# Patient Record
Sex: MALE | Race: WHITE | Employment: FULL TIME | URBAN - METROPOLITAN AREA
[De-identification: names, ages, dates, MRNs, and addresses within clinical notes are randomized per-mention and may not be internally consistent; named-entity substitution may affect disease eponyms.]

---

## 2017-01-10 ENCOUNTER — OFFICE VISIT (OUTPATIENT)
Dept: FAMILY MEDICINE | Facility: CLINIC | Age: 37
End: 2017-01-10
Payer: COMMERCIAL

## 2017-01-10 VITALS
TEMPERATURE: 98.1 F | BODY MASS INDEX: 25.57 KG/M2 | WEIGHT: 221 LBS | HEIGHT: 78 IN | SYSTOLIC BLOOD PRESSURE: 139 MMHG | DIASTOLIC BLOOD PRESSURE: 77 MMHG | RESPIRATION RATE: 16 BRPM | HEART RATE: 79 BPM

## 2017-01-10 DIAGNOSIS — F41.9 ANXIETY: ICD-10-CM

## 2017-01-10 DIAGNOSIS — F33.0 MAJOR DEPRESSIVE DISORDER, RECURRENT EPISODE, MILD (H): Primary | ICD-10-CM

## 2017-01-10 PROCEDURE — 99213 OFFICE O/P EST LOW 20 MIN: CPT | Performed by: PHYSICIAN ASSISTANT

## 2017-01-10 RX ORDER — HYDROXYZINE HYDROCHLORIDE 25 MG/1
25-50 TABLET, FILM COATED ORAL EVERY 6 HOURS PRN
Qty: 60 TABLET | Refills: 1 | Status: SHIPPED | OUTPATIENT
Start: 2017-01-10 | End: 2017-04-02

## 2017-01-10 RX ORDER — FLUOXETINE 10 MG/1
CAPSULE ORAL
Qty: 60 CAPSULE | Refills: 1 | Status: SHIPPED | OUTPATIENT
Start: 2017-01-10 | End: 2017-02-13

## 2017-01-10 RX ORDER — BUPROPION HYDROCHLORIDE 150 MG/1
150 TABLET ORAL EVERY MORNING
Qty: 7 TABLET | Refills: 0 | Status: SHIPPED | OUTPATIENT
Start: 2017-01-10 | End: 2017-02-13

## 2017-01-10 ASSESSMENT — ANXIETY QUESTIONNAIRES
2. NOT BEING ABLE TO STOP OR CONTROL WORRYING: MORE THAN HALF THE DAYS
7. FEELING AFRAID AS IF SOMETHING AWFUL MIGHT HAPPEN: NOT AT ALL
3. WORRYING TOO MUCH ABOUT DIFFERENT THINGS: NEARLY EVERY DAY
5. BEING SO RESTLESS THAT IT IS HARD TO SIT STILL: MORE THAN HALF THE DAYS
1. FEELING NERVOUS, ANXIOUS, OR ON EDGE: NEARLY EVERY DAY
GAD7 TOTAL SCORE: 14
6. BECOMING EASILY ANNOYED OR IRRITABLE: SEVERAL DAYS

## 2017-01-10 ASSESSMENT — PATIENT HEALTH QUESTIONNAIRE - PHQ9: 5. POOR APPETITE OR OVEREATING: NEARLY EVERY DAY

## 2017-01-10 NOTE — PATIENT INSTRUCTIONS
Patient Education    Fluoxetine Hydrochloride Oral capsule [Depression/Mood Disorders]    Fluoxetine Hydrochloride Oral capsule [Premenstrual Syndrome]    Fluoxetine Hydrochloride Oral capsule, gastro-resistant pellets, weekly [Depression/Mood Disorders]    Fluoxetine Hydrochloride Oral solution [Depression/Mood Disorders]    Fluoxetine Hydrochloride Oral tablet [Depression/Mood Disorders]    Fluoxetine Hydrochloride Oral tablet [Premenstrual Syndrome]  Fluoxetine Hydrochloride Oral capsule [Depression/Mood Disorders]  What is this medicine?  FLUOXETINE (floo OX e teen) belongs to a class of drugs known as selective serotonin reuptake inhibitors (SSRIs). It helps to treat mood problems such as depression, obsessive compulsive disorder, and panic attacks. It can also treat certain eating disorders.  This medicine may be used for other purposes; ask your health care provider or pharmacist if you have questions.  What should I tell my health care provider before I take this medicine?  They need to know if you have any of these conditions:    bipolar disorder or andrew    diabetes    glaucoma    liver disease    psychosis    seizures    suicidal thoughts or history of attempted suicide    an unusual or allergic reaction to fluoxetine, other medicines, foods, dyes, or preservatives    pregnant or trying to get pregnant    breast-feeding  How should I use this medicine?  Take this medicine by mouth with a glass of water. Follow the directions on the prescription label. You can take this medicine with or without food. Take your medicine at regular intervals. Do not take it more often than directed. Do not stop taking this medicine suddenly except upon the advice of your doctor. Stopping this medicine too quickly may cause serious side effects or your condition may worsen.  A special MedGuide will be given to you by the pharmacist with each prescription and refill. Be sure to read this information carefully each  time.  Talk to your pediatrician regarding the use of this medicine in children. While this drug may be prescribed for children as young as 7 years for selected conditions, precautions do apply.  Overdosage: If you think you have taken too much of this medicine contact a poison control center or emergency room at once.  NOTE: This medicine is only for you. Do not share this medicine with others.  What if I miss a dose?  If you miss a dose, skip the missed dose and go back to your regular dosing schedule. Do not take double or extra doses.  What may interact with this medicine?  Do not take fluoxetine with any of the following medications:    other medicines containing fluoxetine, like Sarafem or Symbyax    cisapride    linezolid    MAOIs like Carbex, Eldepryl, Marplan, Nardil, and Parnate    methylene blue (injected into a vein)    pimozide    thioridazine  This medicine may also interact with the following medications:    alcohol    aspirin and aspirin-like medicines    carbamazepine    certain medicines for depression, anxiety, or psychotic disturbances    certainmedicines for migraine headaches like almotriptan, eletriptan, frovatriptan, naratriptan, rizatriptan, sumatriptan, zolmitriptan    digoxin    diuretics    fentanyl    flecainide    furazolidone    isoniazid    lithium    medicines for sleep    medicines that treat or prevent blood clots like warfarin, enoxaparin, and dalteparin    NSAIDs, medicines for pain and inflammation, like ibuprofen or naproxen    phenytoin    procarbazine    propafenone    rasagiline    ritonavir    supplements like Moncho's wort, kava kava, valerian    tramadol    tryptophan    vinblastine  This list may not describe all possible interactions. Give your health care provider a list of all the medicines, herbs, non-prescription drugs, or dietary supplements you use. Also tell them if you smoke, drink alcohol, or use illegal drugs. Some items may interact with your  medicine.  What should I watch for while using this medicine?  Tell your doctor if your symptoms do not get better or if they get worse. Visit your doctor or health care professional for regular checks on your progress. Because it may take several weeks to see the full effects of this medicine, it is important to continue your treatment as prescribed by your doctor.  Patients and their families should watch out for new or worsening thoughts of suicide or depression. Also watch out for sudden changes in feelings such as feeling anxious, agitated, panicky, irritable, hostile, aggressive, impulsive, severely restless, overly excited and hyperactive, or not being able to sleep. If this happens, especially at the beginning of treatment or after a change in dose, call your health care professional.  You may get drowsy or dizzy. Do not drive, use machinery, or do anything that needs mental alertness until you know how this medicine affects you. Do not stand or sit up quickly, especially if you are an older patient. This reduces the risk of dizzy or fainting spells. Alcohol may interfere with the effect of this medicine. Avoid alcoholic drinks.  Your mouth may get dry. Chewing sugarless gum or sucking hard candy, and drinking plenty of water may help. Contact your doctor if the problem does not go away or is severe.  This medicine may affect blood sugar levels. If you have diabetes, check with your doctor or health care professional before you change your diet or the dose of your diabetic medicine.  What side effects may I notice from receiving this medicine?  Side effects that you should report to your doctor or health care professional as soon as possible:    allergic reactions like skin rash, itching or hives, swelling of the face, lips, or tongue    breathing problems    confusion    eye pain, changes in vision    fast or irregular heart rate, palpitations    flu-like fever, chills, cough, muscle or joint aches and  pains    seizures    suicidal thoughts or other mood changes    swelling or redness in or around the eye    tremors    trouble sleeping    unusual bleeding or bruising    unusually tired or weak    vomiting  Side effects that usually do not require medical attention (report to your doctor or health care professional if they continue or are bothersome):    change in sex drive or performance    diarrhea    dry mouth    flushing    headache    increased or decreased appetite    nausea    sweating  This list may not describe all possible side effects. Call your doctor for medical advice about side effects. You may report side effects to FDA at 9-748-FDA-5010.  Where should I keep my medicine?  Keep out of the reach of children.  Store at room temperature between 15 and 30 degrees C (59 and 86 degrees F). Throw away any unused medicine after the expiration date.  NOTE:This sheet is a summary. It may not cover all possible information. If you have questions about this medicine, talk to your doctor, pharmacist, or health care provider. Copyright  2016 Gold Standard

## 2017-01-10 NOTE — NURSING NOTE
"Chief Complaint   Patient presents with     Anxiety     Initial /77 mmHg  Pulse 79  Temp(Src) 98.1  F (36.7  C) (Oral)  Resp 16  Ht 6' 7\" (2.007 m)  Wt 221 lb (100.245 kg)  BMI 24.89 kg/m2 Estimated body mass index is 24.89 kg/(m^2) as calculated from the following:    Height as of this encounter: 6' 7\" (2.007 m).    Weight as of this encounter: 221 lb (100.245 kg)..  BP completed using cuff size regular-RA      "

## 2017-01-10 NOTE — PROGRESS NOTES
"  SUBJECTIVE:                                                    Wicho Vaughn is a 36 year old male who presents to clinic today for the following health issues:    Depression and Anxiety Follow-Up    Status since last visit: depression-not noticed any changes since starting Wellbutrin    Other associated symptoms: anxiety worsened the past week, decreased appetite, troubles sleeping, low motivation     Current therapy: currently taking Wellbutrin 300 mg daily. Had initial meeting with therapist. He describes himself as a private person and discussing personal issues was challenging.      Significant life event: going through divorce     Current substance abuse: currently using marijuana daily x last several months (smoking, edibles), not using alcohol or drugs otherwise    Other hx: the patient has been using his wife's Ativan for last two weeks for anxiety reduction; used ~7-10 doses. Currently requesting personal Rx for Ativan. Describes friends as support network.    PHQ-9 SCORE 7/3/2013 12/16/2016   Total Score 8 -   Total Score - 19     CORBY-7 SCORE 12/16/2016   Total Score 16        PHQ-9  English      PHQ-9   Any Language     GAD7       Problem list and histories reviewed & adjusted, as indicated.  Additional history: as documented    Problem list, Medication list, Allergies, and Medical/Social/Surgical histories reviewed in [facility] and updated as appropriate.    ROS:  Constitutional, HEENT, cardiovascular, pulmonary, psych, neuro systems are negative, except as otherwise noted.    OBJECTIVE:                                                    /77 mmHg  Pulse 79  Temp(Src) 98.1  F (36.7  C) (Oral)  Resp 16  Ht 6' 7\" (2.007 m)  Wt 221 lb (100.245 kg)  BMI 24.89 kg/m2  Body mass index is 24.89 kg/(m^2).  GENERAL: healthy, alert and no distress  RESP: lungs clear to auscultation - no rales, rhonchi or wheezes  CV: regular rates and rhythm, normal S1 S2, no S3 or S4 and no murmur, click or " rub  PSYCH: mentation appears normal, affect normal/bright    Provider exam: NAD. Mentation normal. Normal affect. No respiratory distress.       Diagnostic Test Results:  none      ASSESSMENT/PLAN:                                                      (F33.0) Major depressive disorder, recurrent episode, mild (H)  (primary encounter diagnosis)  Comment: has not improved with wellbutrin. Discussed other management options. Has never been on SSRI. Will attempt prozac. Recommending follow up in 1 month for med check. Will taper off of wellbutrin and taper up on prozac.   Plan: buPROPion (WELLBUTRIN XL) 150 MG 24 hr tablet,         FLUoxetine (PROZAC) 10 MG capsule        -Medication use and side effects discussed with the patient. Patient is in complete understanding and agreement with plan.       (F41.9) Anxiety  Comment: as above. Discussed how wellbutrin is not routinely helpful in anxiety. Also discussed how marijuana use is likely making anxiety worse. Recommending discontinuing this. Also informed given marijuana use, will not be able to prescribed ativan and advise not using this in conjunction with marijuana. Will however give atarax to patient with hope this aids with worsening anxiety/panic attacks while prozac is allowed to work. Educated this may take 2-4 weeks.   Plan: FLUoxetine (PROZAC) 10 MG capsule, hydrOXYzine         (ATARAX) 25 MG tablet        -Medication use and side effects discussed with the patient. Patient is in complete understanding and agreement with plan.         Follow up: as above.     Of note, Rocío VELASQUEZS saw patient conducting above history and personal exam. I conducted my own exam, assessment, and plan as outlined above. Bolded changes were made to aid in teaching of student.         Alex Holguin PA-C  [facility]

## 2017-01-10 NOTE — MR AVS SNAPSHOT
After Visit Summary   1/10/2017    Wicho Vaughn    MRN: [id number]           Patient Information     Date Of Birth          1980        Visit Information        Provider Department      1/10/2017 7:15 AM Alex Holguin PA-C [facility]        Today's Diagnoses     Major depressive disorder, recurrent episode, mild (H)    -  1     Anxiety           Care Instructions      Patient Education    Fluoxetine Hydrochloride Oral capsule [Depression/Mood Disorders]    Fluoxetine Hydrochloride Oral capsule [Premenstrual Syndrome]    Fluoxetine Hydrochloride Oral capsule, gastro-resistant pellets, weekly [Depression/Mood Disorders]    Fluoxetine Hydrochloride Oral solution [Depression/Mood Disorders]    Fluoxetine Hydrochloride Oral tablet [Depression/Mood Disorders]    Fluoxetine Hydrochloride Oral tablet [Premenstrual Syndrome]  Fluoxetine Hydrochloride Oral capsule [Depression/Mood Disorders]  What is this medicine?  FLUOXETINE (floo OX e teen) belongs to a class of drugs known as selective serotonin reuptake inhibitors (SSRIs). It helps to treat mood problems such as depression, obsessive compulsive disorder, and panic attacks. It can also treat certain eating disorders.  This medicine may be used for other purposes; ask your health care provider or pharmacist if you have questions.  What should I tell my health care provider before I take this medicine?  They need to know if you have any of these conditions:    bipolar disorder or andrew    diabetes    glaucoma    liver disease    psychosis    seizures    suicidal thoughts or history of attempted suicide    an unusual or allergic reaction to fluoxetine, other medicines, foods, dyes, or preservatives    pregnant or trying to get pregnant    breast-feeding  How should I use this medicine?  Take this medicine by mouth with a glass of water. Follow the directions on the prescription label. You can take this medicine with or  without food. Take your medicine at regular intervals. Do not take it more often than directed. Do not stop taking this medicine suddenly except upon the advice of your doctor. Stopping this medicine too quickly may cause serious side effects or your condition may worsen.  A special MedGuide will be given to you by the pharmacist with each prescription and refill. Be sure to read this information carefully each time.  Talk to your pediatrician regarding the use of this medicine in children. While this drug may be prescribed for children as young as 7 years for selected conditions, precautions do apply.  Overdosage: If you think you have taken too much of this medicine contact a poison control center or emergency room at once.  NOTE: This medicine is only for you. Do not share this medicine with others.  What if I miss a dose?  If you miss a dose, skip the missed dose and go back to your regular dosing schedule. Do not take double or extra doses.  What may interact with this medicine?  Do not take fluoxetine with any of the following medications:    other medicines containing fluoxetine, like Sarafem or Symbyax    cisapride    linezolid    MAOIs like Carbex, Eldepryl, Marplan, Nardil, and Parnate    methylene blue (injected into a vein)    pimozide    thioridazine  This medicine may also interact with the following medications:    alcohol    aspirin and aspirin-like medicines    carbamazepine    certain medicines for depression, anxiety, or psychotic disturbances    certainmedicines for migraine headaches like almotriptan, eletriptan, frovatriptan, naratriptan, rizatriptan, sumatriptan, zolmitriptan    digoxin    diuretics    fentanyl    flecainide    furazolidone    isoniazid    lithium    medicines for sleep    medicines that treat or prevent blood clots like warfarin, enoxaparin, and dalteparin    NSAIDs, medicines for pain and inflammation, like ibuprofen or  naproxen    phenytoin    procarbazine    propafenone    rasagiline    ritonavir    supplements like Moncho's wort, kava kava, valerian    tramadol    tryptophan    vinblastine  This list may not describe all possible interactions. Give your health care provider a list of all the medicines, herbs, non-prescription drugs, or dietary supplements you use. Also tell them if you smoke, drink alcohol, or use illegal drugs. Some items may interact with your medicine.  What should I watch for while using this medicine?  Tell your doctor if your symptoms do not get better or if they get worse. Visit your doctor or health care professional for regular checks on your progress. Because it may take several weeks to see the full effects of this medicine, it is important to continue your treatment as prescribed by your doctor.  Patients and their families should watch out for new or worsening thoughts of suicide or depression. Also watch out for sudden changes in feelings such as feeling anxious, agitated, panicky, irritable, hostile, aggressive, impulsive, severely restless, overly excited and hyperactive, or not being able to sleep. If this happens, especially at the beginning of treatment or after a change in dose, call your health care professional.  You may get drowsy or dizzy. Do not drive, use machinery, or do anything that needs mental alertness until you know how this medicine affects you. Do not stand or sit up quickly, especially if you are an older patient. This reduces the risk of dizzy or fainting spells. Alcohol may interfere with the effect of this medicine. Avoid alcoholic drinks.  Your mouth may get dry. Chewing sugarless gum or sucking hard candy, and drinking plenty of water may help. Contact your doctor if the problem does not go away or is severe.  This medicine may affect blood sugar levels. If you have diabetes, check with your doctor or health care professional before you change your diet or the dose of  your diabetic medicine.  What side effects may I notice from receiving this medicine?  Side effects that you should report to your doctor or health care professional as soon as possible:    allergic reactions like skin rash, itching or hives, swelling of the face, lips, or tongue    breathing problems    confusion    eye pain, changes in vision    fast or irregular heart rate, palpitations    flu-like fever, chills, cough, muscle or joint aches and pains    seizures    suicidal thoughts or other mood changes    swelling or redness in or around the eye    tremors    trouble sleeping    unusual bleeding or bruising    unusually tired or weak    vomiting  Side effects that usually do not require medical attention (report to your doctor or health care professional if they continue or are bothersome):    change in sex drive or performance    diarrhea    dry mouth    flushing    headache    increased or decreased appetite    nausea    sweating  This list may not describe all possible side effects. Call your doctor for medical advice about side effects. You may report side effects to FDA at 0-183-LRB-3408.  Where should I keep my medicine?  Keep out of the reach of children.  Store at room temperature between 15 and 30 degrees C (59 and 86 degrees F). Throw away any unused medicine after the expiration date.  NOTE:This sheet is a summary. It may not cover all possible information. If you have questions about this medicine, talk to your doctor, pharmacist, or health care provider. Copyright  2016 Gold Standard              Follow-ups after your visit        Who to contact     If you have questions or need follow up information about today's clinic visit or your schedule please contact [facility] directly at [phone].  Normal or non-critical lab and imaging results will be communicated to you by MyChart, letter or phone within 4 business days after the clinic has received the results. If you do not  "hear from us within 7 days, please contact the clinic through [employer] or phone. If you have a critical or abnormal lab result, we will notify you by phone as soon as possible.  Submit refill requests through [employer] or call your pharmacy and they will forward the refill request to us. Please allow 3 business days for your refill to be completed.          Additional Information About Your Visit        [employer] Information     [employer] gives you secure access to your electronic health record. If you see a primary care provider, you can also send messages to your care team and make appointments. If you have questions, please call your primary care clinic.  If you do not have a primary care provider, please call [phone] and they will assist you.        Care EveryWhere ID     This is your Care EveryWhere ID. This could be used by other organizations to access your [facility] medical records  [id number]        Your Vitals Were     Pulse Temperature Respirations Height BMI (Body Mass Index)       79 98.1  F (36.7  C) (Oral) 16 6' 7\" (2.007 m) 24.89 kg/m2        Blood Pressure from Last 3 Encounters:   01/10/17 139/77   12/16/16 138/85   11/11/16 122/86    Weight from Last 3 Encounters:   01/10/17 221 lb (100.245 kg)   12/16/16 226 lb 3.2 oz (102.604 kg)   11/11/16 230 lb (104.327 kg)              Today, you had the following     No orders found for display         Today's Medication Changes          These changes are accurate as of: 1/10/17  8:43 AM.  If you have any questions, ask your nurse or doctor.               Start taking these medicines.        Dose/Directions    FLUoxetine 10 MG capsule   Commonly known as:  PROzac   Used for:  Anxiety, Major depressive disorder, recurrent episode, mild (H)   Started by:  Alex Holguin PA-C        Take 1 caps (10 mg) daily for 1-2 weeks. Then increase to 2 caps (20 mg) daily.   Quantity:  60 capsule   Refills:  1       hydrOXYzine 25 MG tablet   Commonly known as:  " ATARAX   Used for:  Anxiety   Started by:  Alex Holguin PA-C        Dose:  25-50 mg   Take 1-2 tablets (25-50 mg) by mouth every 6 hours as needed for anxiety or other (sleep)   Quantity:  60 tablet   Refills:  1         These medicines have changed or have updated prescriptions.        Dose/Directions    buPROPion 150 MG 24 hr tablet   Commonly known as:  WELLBUTRIN XL   This may have changed:  Another medication with the same name was removed. Continue taking this medication, and follow the directions you see here.   Used for:  Major depressive disorder, recurrent episode, mild (H)   Changed by:  Alex Holguin PA-C        Dose:  150 mg   Take 1 tablet (150 mg) by mouth every morning   Quantity:  7 tablet   Refills:  0            Where to get your medicines      These medications were sent to [facility] [address]  [address] [facility] [address]     Phone:  [phone]    - buPROPion 150 MG 24 hr tablet  - FLUoxetine 10 MG capsule  - hydrOXYzine 25 MG tablet             Primary Care Provider Office Phone # Fax #    Jefferson Rubio [phone]       [facility] [address]  [facility] [address]        Thank you!     Thank you for choosing [facility]  for your care. Our goal is always to provide you with excellent care. Hearing back from our patients is one way we can continue to improve our services. Please take a few minutes to complete the written survey that you may receive in the mail after your visit with us. Thank you!             Your Updated Medication List - Protect others around you: Learn how to safely use, store and throw away your medicines at www.disposemymeds.org.          This list is accurate as of: 1/10/17  8:43 AM.  Always use your most recent med list.                   Brand Name Dispense Instructions for use    buPROPion 150 MG 24 hr tablet    WELLBUTRIN XL    7  tablet    Take 1 tablet (150 mg) by mouth every morning       FLUoxetine 10 MG capsule    PROzac    60 capsule    Take 1 caps (10 mg) daily for 1-2 weeks. Then increase to 2 caps (20 mg) daily.       hydrOXYzine 25 MG tablet    ATARAX    60 tablet    Take 1-2 tablets (25-50 mg) by mouth every 6 hours as needed for anxiety or other (sleep)       pantoprazole 40 MG EC tablet    PROTONIX    90 tablet    Take 1 tablet (40 mg) by mouth daily       SUMAtriptan 100 MG tablet    IMITREX    18 tablet    Take 1 tablet (100 mg) by mouth at onset of headache for migraine May repeat in 2 hours if needed: max 2/day; average number of headaches monthly 4       SUMAtriptan 20 MG/ACT nasal spray    IMITREX    3 Inhaler    Spray 1 spray (20 mg) in nostril as needed for migraine May repeat dose in 2 hours.  Do not exceed 40 mg in 24 hours

## 2017-01-11 ASSESSMENT — ANXIETY QUESTIONNAIRES: GAD7 TOTAL SCORE: 14

## 2017-01-11 ASSESSMENT — PATIENT HEALTH QUESTIONNAIRE - PHQ9: SUM OF ALL RESPONSES TO PHQ QUESTIONS 1-9: 21

## 2017-02-13 ENCOUNTER — OFFICE VISIT (OUTPATIENT)
Dept: FAMILY MEDICINE | Facility: CLINIC | Age: 37
End: 2017-02-13
Payer: COMMERCIAL

## 2017-02-13 VITALS
TEMPERATURE: 98.1 F | BODY MASS INDEX: 24.57 KG/M2 | RESPIRATION RATE: 14 BRPM | DIASTOLIC BLOOD PRESSURE: 88 MMHG | SYSTOLIC BLOOD PRESSURE: 142 MMHG | HEIGHT: 78 IN | WEIGHT: 212.4 LBS | HEART RATE: 84 BPM

## 2017-02-13 DIAGNOSIS — F41.9 ANXIETY: ICD-10-CM

## 2017-02-13 DIAGNOSIS — F33.0 MAJOR DEPRESSIVE DISORDER, RECURRENT EPISODE, MILD (H): ICD-10-CM

## 2017-02-13 DIAGNOSIS — R03.0 ELEVATED BLOOD PRESSURE READING WITHOUT DIAGNOSIS OF HYPERTENSION: Primary | ICD-10-CM

## 2017-02-13 PROCEDURE — 99213 OFFICE O/P EST LOW 20 MIN: CPT | Performed by: FAMILY MEDICINE

## 2017-02-13 ASSESSMENT — ANXIETY QUESTIONNAIRES
7. FEELING AFRAID AS IF SOMETHING AWFUL MIGHT HAPPEN: NOT AT ALL
6. BECOMING EASILY ANNOYED OR IRRITABLE: SEVERAL DAYS
IF YOU CHECKED OFF ANY PROBLEMS ON THIS QUESTIONNAIRE, HOW DIFFICULT HAVE THESE PROBLEMS MADE IT FOR YOU TO DO YOUR WORK, TAKE CARE OF THINGS AT HOME, OR GET ALONG WITH OTHER PEOPLE: NOT DIFFICULT AT ALL
5. BEING SO RESTLESS THAT IT IS HARD TO SIT STILL: NEARLY EVERY DAY
1. FEELING NERVOUS, ANXIOUS, OR ON EDGE: NEARLY EVERY DAY
GAD7 TOTAL SCORE: 14
2. NOT BEING ABLE TO STOP OR CONTROL WORRYING: MORE THAN HALF THE DAYS
3. WORRYING TOO MUCH ABOUT DIFFERENT THINGS: MORE THAN HALF THE DAYS

## 2017-02-13 ASSESSMENT — PATIENT HEALTH QUESTIONNAIRE - PHQ9: 5. POOR APPETITE OR OVEREATING: NEARLY EVERY DAY

## 2017-02-13 NOTE — MR AVS SNAPSHOT
"              After Visit Summary   2/13/2017    Wicho Vaughn    MRN: [id number]           Patient Information     Date Of Birth          1980        Visit Information        Provider Department      2/13/2017 6:00 PM Kali Cadet MD [facility]        Today's Diagnoses     Elevated blood pressure reading without diagnosis of hypertension    -  1    Anxiety        Major depressive disorder, recurrent episode, mild (H)           Follow-ups after your visit        Who to contact     If you have questions or need follow up information about today's clinic visit or your schedule please contact [facility] directly at [phone].  Normal or non-critical lab and imaging results will be communicated to you by MyChart, letter or phone within 4 business days after the clinic has received the results. If you do not hear from us within 7 days, please contact the clinic through [employer] or phone. If you have a critical or abnormal lab result, we will notify you by phone as soon as possible.  Submit refill requests through [employer] or call your pharmacy and they will forward the refill request to us. Please allow 3 business days for your refill to be completed.          Additional Information About Your Visit        MyChart Information     [employer] gives you secure access to your electronic health record. If you see a primary care provider, you can also send messages to your care team and make appointments. If you have questions, please call your primary care clinic.  If you do not have a primary care provider, please call [phone] and they will assist you.        Care EveryWhere ID     This is your Care EveryWhere ID. This could be used by other organizations to access your [facility] medical records  [id number]        Your Vitals Were     Pulse Temperature Respirations Height BMI (Body Mass Index)       84 98.1  F (36.7  C) (Oral) 14 6' 7\" (2.007 m) 23.93 kg/m2        " Blood Pressure from Last 3 Encounters:   02/13/17 142/88   01/10/17 139/77   12/16/16 138/85    Weight from Last 3 Encounters:   02/13/17 212 lb 6.4 oz (96.3 kg)   01/10/17 221 lb (100.2 kg)   12/16/16 226 lb 3.2 oz (102.6 kg)              Today, you had the following     No orders found for display         Today's Medication Changes          These changes are accurate as of: 2/13/17  6:47 PM.  If you have any questions, ask your nurse or doctor.               Start taking these medicines.        Dose/Directions    FLUoxetine 20 MG capsule   Commonly known as:  PROzac   Used for:  Anxiety, Major depressive disorder, recurrent episode, mild (H)   Started by:  Kali Cadet MD        Dose:  40 mg   Take 2 capsules (40 mg) by mouth daily   Quantity:  60 capsule   Refills:  1            Where to get your medicines      These medications were sent to [address]  [id number] [place] [id number]     Phone:  [phone]     FLUoxetine 20 MG capsule                Primary Care Provider    None Specified       No primary provider on file.        Thank you!     Thank you for choosing [facility]  for your care. Our goal is always to provide you with excellent care. Hearing back from our patients is one way we can continue to improve our services. Please take a few minutes to complete the written survey that you may receive in the mail after your visit with us. Thank you!             Your Updated Medication List - Protect others around you: Learn how to safely use, store and throw away your medicines at www.disposemymeds.org.          This list is accurate as of: 2/13/17  6:47 PM.  Always use your most recent med list.                   Brand Name Dispense Instructions for use    FLUoxetine 20 MG capsule    PROzac    60 capsule    Take 2 capsules (40 mg) by mouth daily       hydrOXYzine 25 MG tablet    ATARAX    60 tablet    Take 1-2 tablets  (25-50 mg) by mouth every 6 hours as needed for anxiety or other (sleep)       pantoprazole 40 MG EC tablet    PROTONIX    90 tablet    Take 1 tablet (40 mg) by mouth daily       SUMAtriptan 100 MG tablet    IMITREX    18 tablet    Take 1 tablet (100 mg) by mouth at onset of headache for migraine May repeat in 2 hours if needed: max 2/day; average number of headaches monthly 4       SUMAtriptan 20 MG/ACT nasal spray    IMITREX    3 Inhaler    Spray 1 spray (20 mg) in nostril as needed for migraine May repeat dose in 2 hours.  Do not exceed 40 mg in 24 hours

## 2017-02-14 ASSESSMENT — ANXIETY QUESTIONNAIRES: GAD7 TOTAL SCORE: 14

## 2017-02-14 ASSESSMENT — PATIENT HEALTH QUESTIONNAIRE - PHQ9: SUM OF ALL RESPONSES TO PHQ QUESTIONS 1-9: 20

## 2017-02-14 NOTE — PROGRESS NOTES
"  SUBJECTIVE:                                                    Wicho Vaughn is a 36 year old male who presents to [facility] today for the following health issues:      Medication Followup of Prozac    Taking Medication as prescribed: yes    Side Effects:  None    Medication Helping Symptoms:  No After review, he decides irritability is improved     Elevated blood pressure reading without diagnosis of hypertension  (primary encounter diagnosis): BP normal today  BP Readings from Last 6 Encounters:   02/13/17 142/88   01/10/17 139/77   12/16/16 138/85   11/11/16 122/86   07/18/16 120/80   12/18/15 (!) 143/93       Anxiety:   CORBY-7 SCORE 12/16/2016 1/10/2017   Total Score 16 14       Major depressive disorder, recurrent episode, mild (H): Patient states that his depression has not improved since his last visit. Patient has seen his counselor twice.   PHQ-9 SCORE 7/3/2013 12/16/2016 1/10/2017   Total Score 8 - -   Total Score - 19 21        SOC seeing  Fri re divorce  ROS: Has dry mouth, difficulty concentrating, no appetite, anhedonia, difficulty sleeping. Denies day-time fatigue. Migraines have improved     This document serves as a record of the services and decisions personally performed and made by Helio Bass MD. It was created on his behalf by Adán Guzmán a trained medical scribe. The creation of this document is based the provider's statements to the medical scribe. Adán Guzmán February 13, 2017 6:25 PM  OBJECTIVE:                                                    /88 (BP Location: Right arm, Patient Position: Chair, Cuff Size: Adult Large)  Pulse 84  Temp 98.1  F (36.7  C) (Oral)  Resp 14  Ht 2.007 m (6' 7\")  Wt 96.3 kg (212 lb 6.4 oz)  BMI 23.93 kg/m2  Body mass index is 23.93 kg/(m^2).  GEN: Healthy, Alert, No distress     ASSESSMENT/PLAN:                                                    (R03.0) Elevated blood pressure reading without diagnosis of hypertension  (primary encounter " diagnosis)  Comment: BP normal today   BP Readings from Last 6 Encounters:   02/13/17 142/88   01/10/17 139/77   12/16/16 138/85   11/11/16 122/86   07/18/16 120/80   12/18/15 (!) 143/93       (F41.9) Anxiety  Comment: Increase Prozac from 20 mg to 40 mg  Plan: FLUoxetine (PROZAC) 20 MG capsule,         DISCONTINUED: FLUoxetine (PROZAC) 20 MG capsule. Seeing counseling now twice            (F33.0) Major depressive disorder, recurrent episode, mild (H)  Comment: Increase Prozac from 20 mg to 40 mg  Plan: FLUoxetine (PROZAC) 20 MG capsule,         DISCONTINUED: FLUoxetine (PROZAC) 20 MG capsule         Monitor BP  RCK in 1 month    The information in this document, created by a scribe for me, accurately reflects the services I personally performed and the decisions made by me. I have reviewed and approved this document for accuracy. 6:25 PM February 13, 2017    SEGUN CASTRO MD  [facility]

## 2017-02-14 NOTE — NURSING NOTE
"Chief Complaint   Patient presents with     Recheck Medication       Initial There were no vitals taken for this visit. Estimated body mass index is 24.9 kg/(m^2) as calculated from the following:    Height as of 1/10/17: 6' 7\" (2.007 m).    Weight as of 1/10/17: 221 lb (100.2 kg).  Medication Reconciliation: complete rt arm Yun Perrin MA      "

## 2017-04-02 DIAGNOSIS — F41.9 ANXIETY: ICD-10-CM

## 2017-04-03 NOTE — TELEPHONE ENCOUNTER
Pending Prescriptions:                       Disp   Refills    hydrOXYzine (ATARAX) 25 MG tablet [Pharma*60 tab*1            Sig: TAKE ONE TO TWO TABLETS BY MOUTH EVERY 6 HOURS AS           NEEDED FOR ANXIETY OR SLEEP              Last Written Prescription Date: 1/10/2017  Last Fill Quantity: 60,  # refills: 1   Last Office Visit with BALDEMAR, MYRA or [facility] prescribing provider: 2/13/2017Helio

## 2017-04-04 RX ORDER — HYDROXYZINE HYDROCHLORIDE 25 MG/1
TABLET, FILM COATED ORAL
Qty: 60 TABLET | Refills: 1 | Status: SHIPPED | OUTPATIENT
Start: 2017-04-04

## 2017-07-20 DIAGNOSIS — K21.9 GASTROESOPHAGEAL REFLUX DISEASE, ESOPHAGITIS PRESENCE NOT SPECIFIED: ICD-10-CM

## 2017-07-21 NOTE — TELEPHONE ENCOUNTER
Pantoprazole sodium 40mg      Last Written Prescription Date: [id number]  Last Fill Quantity: 90,  # refills: 3   Last Office Visit with G, [facility] or [facility] prescribing provider: 1/10/2017    Angie Tillman  Pharmacy technician   [facility]

## 2017-07-22 NOTE — TELEPHONE ENCOUNTER
Routing refill request to provider to review approval because:  > one year since last associated diagnosis, route to inform pt if appointment due  Katarina Barraza RN, BSN  Message handled by Nurse Triage.

## 2017-07-24 RX ORDER — PANTOPRAZOLE SODIUM 40 MG/1
TABLET, DELAYED RELEASE ORAL
Qty: 90 TABLET | OUTPATIENT
Start: 2017-07-24

## 2017-07-24 RX ORDER — PANTOPRAZOLE SODIUM 40 MG/1
40 TABLET, DELAYED RELEASE ORAL DAILY
Qty: 30 TABLET | Refills: 0 | Status: SHIPPED | OUTPATIENT
Start: 2017-07-24

## 2017-07-24 NOTE — TELEPHONE ENCOUNTER
Needs OV and I recommend making this a fasting physical to make cost less. Also patient's BP was elevated at last visit.     -Pranav Holguin, PAC

## 2017-07-24 NOTE — TELEPHONE ENCOUNTER
Pt calls, pt agrees, one month extended, just signed divorce papers, wants to check insurance and will call back to schedule  Katarina Barraza RN, BSN  Message handled by Nurse Triage.

## 2019-12-08 ENCOUNTER — HEALTH MAINTENANCE LETTER (OUTPATIENT)
Age: 39
End: 2019-12-08

## 2021-01-10 ENCOUNTER — HEALTH MAINTENANCE LETTER (OUTPATIENT)
Age: 41
End: 2021-01-10

## 2021-10-23 ENCOUNTER — HEALTH MAINTENANCE LETTER (OUTPATIENT)
Age: 41
End: 2021-10-23

## 2022-02-12 ENCOUNTER — HEALTH MAINTENANCE LETTER (OUTPATIENT)
Age: 42
End: 2022-02-12

## 2022-10-09 ENCOUNTER — HEALTH MAINTENANCE LETTER (OUTPATIENT)
Age: 42
End: 2022-10-09

## 2023-02-18 ENCOUNTER — HEALTH MAINTENANCE LETTER (OUTPATIENT)
Age: 43
End: 2023-02-18

## 2024-03-16 ENCOUNTER — HEALTH MAINTENANCE LETTER (OUTPATIENT)
Age: 44
End: 2024-03-16